# Patient Record
Sex: FEMALE | Race: BLACK OR AFRICAN AMERICAN | URBAN - METROPOLITAN AREA
[De-identification: names, ages, dates, MRNs, and addresses within clinical notes are randomized per-mention and may not be internally consistent; named-entity substitution may affect disease eponyms.]

---

## 2020-06-22 ENCOUNTER — OFFICE VISIT (OUTPATIENT)
Dept: URBAN - METROPOLITAN AREA SURGERY CENTER 18 | Facility: SURGERY CENTER | Age: 50
End: 2020-06-22

## 2020-10-07 ENCOUNTER — OFFICE VISIT (OUTPATIENT)
Dept: URBAN - METROPOLITAN AREA TELEHEALTH 2 | Facility: TELEHEALTH | Age: 50
End: 2020-10-07
Payer: COMMERCIAL

## 2020-10-07 DIAGNOSIS — R49.0 HOARSENESS OF VOICE: ICD-10-CM

## 2020-10-07 DIAGNOSIS — K92.89 GAS BLOAT SYNDROME: ICD-10-CM

## 2020-10-07 DIAGNOSIS — K21.00 GASTROESOPHAGEAL REFLUX DISEASE WITH ESOPHAGITIS WITHOUT HEMORRHAGE: ICD-10-CM

## 2020-10-07 DIAGNOSIS — Z12.11 SCREEN FOR COLON CANCER: ICD-10-CM

## 2020-10-07 PROBLEM — 50219008: Status: ACTIVE | Noted: 2020-10-07

## 2020-10-07 PROBLEM — 305058001: Status: ACTIVE | Noted: 2020-10-07

## 2020-10-07 PROCEDURE — 99204 OFFICE O/P NEW MOD 45 MIN: CPT | Performed by: INTERNAL MEDICINE

## 2020-10-07 RX ORDER — PANTOPRAZOLE SODIUM 40 MG/1
1 TABLET TABLET, DELAYED RELEASE ORAL ONCE A DAY
Qty: 90 | Refills: 3 | OUTPATIENT
Start: 2020-10-07

## 2020-10-07 NOTE — HPI-OTHER HISTORIES
The patient has a history of anemia and migraine headaches who presents for a screening colonoscopy and constipation . The pt notes that she had a hysterectomy in 2010 and she has struggled with constipation for several days. For breakfast, 2 egg whites with toast, fruit  and she notes her water intake and fiber intake is poor. The pt notes that she has noted increased hoarseness of voice, and coughing at night

## 2020-11-20 ENCOUNTER — CLAIMS CREATED FROM THE CLAIM WINDOW (OUTPATIENT)
Dept: URBAN - METROPOLITAN AREA CLINIC 4 | Facility: CLINIC | Age: 50
End: 2020-11-20
Payer: COMMERCIAL

## 2020-11-20 ENCOUNTER — TELEPHONE ENCOUNTER (OUTPATIENT)
Dept: URBAN - METROPOLITAN AREA CLINIC 92 | Facility: CLINIC | Age: 50
End: 2020-11-20

## 2020-11-20 ENCOUNTER — OFFICE VISIT (OUTPATIENT)
Dept: URBAN - METROPOLITAN AREA SURGERY CENTER 16 | Facility: SURGERY CENTER | Age: 50
End: 2020-11-20
Payer: COMMERCIAL

## 2020-11-20 DIAGNOSIS — K29.60 OTHER GASTRITIS WITHOUT BLEEDING: ICD-10-CM

## 2020-11-20 DIAGNOSIS — K62.1 DYSPLASTIC POLYP OF RECTUM: ICD-10-CM

## 2020-11-20 DIAGNOSIS — K25.7 CHRONIC GASTRIC ULCER WITHOUT HEMORRHAGE OR PERFORATION: ICD-10-CM

## 2020-11-20 DIAGNOSIS — Z12.11 COLON CANCER SCREENING: ICD-10-CM

## 2020-11-20 DIAGNOSIS — K31.89 ACQUIRED DEFORMITY OF DUODENUM: ICD-10-CM

## 2020-11-20 DIAGNOSIS — K31.89 OTHER DISEASES OF STOMACH AND DUODENUM: ICD-10-CM

## 2020-11-20 DIAGNOSIS — K25.9 ANTRAL ULCER: ICD-10-CM

## 2020-11-20 DIAGNOSIS — K63.89 OTHER SPECIFIED DISEASES OF INTESTINE: ICD-10-CM

## 2020-11-20 PROCEDURE — 88342 IMHCHEM/IMCYTCHM 1ST ANTB: CPT | Performed by: PATHOLOGY

## 2020-11-20 PROCEDURE — 43239 EGD BIOPSY SINGLE/MULTIPLE: CPT | Performed by: INTERNAL MEDICINE

## 2020-11-20 PROCEDURE — G9935 CANC NOT DETECTD DURING SRCN: HCPCS | Performed by: INTERNAL MEDICINE

## 2020-11-20 PROCEDURE — G8907 PT DOC NO EVENTS ON DISCHARG: HCPCS | Performed by: INTERNAL MEDICINE

## 2020-11-20 PROCEDURE — 88305 TISSUE EXAM BY PATHOLOGIST: CPT | Performed by: PATHOLOGY

## 2020-11-20 PROCEDURE — 45380 COLONOSCOPY AND BIOPSY: CPT | Performed by: INTERNAL MEDICINE

## 2020-11-20 PROCEDURE — 88341 IMHCHEM/IMCYTCHM EA ADD ANTB: CPT | Performed by: PATHOLOGY

## 2020-11-20 RX ORDER — PANTOPRAZOLE SODIUM 40 MG/1
1 TABLET TABLET, DELAYED RELEASE ORAL ONCE A DAY
Qty: 90 | Refills: 3 | Status: ACTIVE | COMMUNITY
Start: 2020-10-07

## 2020-11-20 RX ORDER — LINACLOTIDE 72 UG/1
1 CAPSULE AT LEAST 30 MINUTES BEFORE THE FIRST MEAL OF THE DAY ON AN EMPTY STOMACH CAPSULE, GELATIN COATED ORAL ONCE A DAY
Qty: 90 | Refills: 3 | OUTPATIENT
Start: 2020-11-20 | End: 2021-11-15

## 2021-01-13 ENCOUNTER — TELEPHONE ENCOUNTER (OUTPATIENT)
Dept: URBAN - METROPOLITAN AREA CLINIC 23 | Facility: CLINIC | Age: 51
End: 2021-01-13

## 2021-01-15 ENCOUNTER — APPOINTMENT (RX ONLY)
Dept: URBAN - METROPOLITAN AREA CLINIC 12 | Facility: CLINIC | Age: 51
Setting detail: DERMATOLOGY
End: 2021-01-15

## 2021-01-15 DIAGNOSIS — N62 HYPERTROPHY OF BREAST: ICD-10-CM

## 2021-01-15 DIAGNOSIS — Z41.1 ENCOUNTER FOR COSMETIC SURGERY: ICD-10-CM

## 2021-01-15 PROCEDURE — ? PHOTOS OBTAINED

## 2021-01-15 PROCEDURE — ? COUNSELING - MACROMASTIA

## 2021-01-15 PROCEDURE — 99212 OFFICE O/P EST SF 10 MIN: CPT

## 2021-01-15 PROCEDURE — ? PATIENT SPECIFIC COUNSELING

## 2021-01-15 ASSESSMENT — LOCATION DETAILED DESCRIPTION DERM
LOCATION DETAILED: LEFT MEDIAL BREAST 8-9:00 REGION
LOCATION DETAILED: RIGHT MEDIAL BREAST 4-5:00 REGION

## 2021-01-15 ASSESSMENT — LOCATION SIMPLE DESCRIPTION DERM
LOCATION SIMPLE: RIGHT BREAST
LOCATION SIMPLE: LEFT BREAST

## 2021-01-15 ASSESSMENT — LOCATION ZONE DERM: LOCATION ZONE: TRUNK

## 2021-01-15 NOTE — PROCEDURE: PATIENT SPECIFIC COUNSELING
Detail Level: Simple
Other (Free Text): Patient present today for breast reduction. Patient reports she is currently a bra size 32K and would like to be 32D. Patient reports she is diagnosed with HS. Patient was referred by Dr. Sam. Dr. Anderson examined patient and reviewed medical history. Patient reports issues with shoulders and back pain and reports no history with breast cancer in family. Dr. Anderson voiced right breast is larger than left and breasts are dense and heavy. Dr. Anderson voiced patient has symptomatic macromastia. Dr. Anderson voiced patient is great candidate for breast reduction and explained surgical options and incision sites. Dr. Anderson explained that with HS, patient will need to be kept on antibiotics to reduce risk of infection. Patient understood all risk, surgery, and healing factors. Photos were taken of the surgical site. Nila stepped in to give the patient a quote. \\n\\nHeight 5”3.5\\nWeight 157 lbs

## 2021-01-28 ENCOUNTER — OFFICE VISIT (OUTPATIENT)
Dept: URBAN - METROPOLITAN AREA CLINIC 105 | Facility: CLINIC | Age: 51
End: 2021-01-28
Payer: COMMERCIAL

## 2021-01-28 VITALS
SYSTOLIC BLOOD PRESSURE: 117 MMHG | HEIGHT: 64 IN | DIASTOLIC BLOOD PRESSURE: 79 MMHG | HEART RATE: 93 BPM | WEIGHT: 159.8 LBS | BODY MASS INDEX: 27.28 KG/M2 | TEMPERATURE: 96.4 F

## 2021-01-28 DIAGNOSIS — K58.1 IRRITABLE BOWEL SYNDROME WITH CONSTIPATION: ICD-10-CM

## 2021-01-28 DIAGNOSIS — Z86.010 PERSONAL HISTORY OF COLONIC POLYPS: ICD-10-CM

## 2021-01-28 DIAGNOSIS — K92.89 GAS BLOAT SYNDROME: ICD-10-CM

## 2021-01-28 DIAGNOSIS — F45.8 AEROPHAGIA: ICD-10-CM

## 2021-01-28 DIAGNOSIS — K21.9 GASTRO-ESOPHAGEAL REFLUX DISEASE WITHOUT ESOPHAGITIS: ICD-10-CM

## 2021-01-28 DIAGNOSIS — F41.9 ANXIETY: ICD-10-CM

## 2021-01-28 PROCEDURE — 99214 OFFICE O/P EST MOD 30 MIN: CPT | Performed by: INTERNAL MEDICINE

## 2021-01-28 PROCEDURE — G8420 CALC BMI NORM PARAMETERS: HCPCS | Performed by: INTERNAL MEDICINE

## 2021-01-28 PROCEDURE — 1036F TOBACCO NON-USER: CPT | Performed by: INTERNAL MEDICINE

## 2021-01-28 PROCEDURE — G8482 FLU IMMUNIZE ORDER/ADMIN: HCPCS | Performed by: INTERNAL MEDICINE

## 2021-01-28 PROCEDURE — 3017F COLORECTAL CA SCREEN DOC REV: CPT | Performed by: INTERNAL MEDICINE

## 2021-01-28 RX ORDER — PANTOPRAZOLE SODIUM 40 MG/1
1 TABLET TABLET, DELAYED RELEASE ORAL ONCE A DAY
Qty: 90 | Refills: 3 | Status: ACTIVE | COMMUNITY
Start: 2020-10-07

## 2021-01-28 RX ORDER — LINACLOTIDE 72 UG/1
1 CAPSULE AT LEAST 30 MINUTES BEFORE THE FIRST MEAL OF THE DAY ON AN EMPTY STOMACH CAPSULE, GELATIN COATED ORAL ONCE A DAY
Qty: 90 | Refills: 3 | Status: ACTIVE | COMMUNITY
Start: 2020-11-20 | End: 2021-11-15

## 2021-01-28 RX ORDER — CITALOPRAM 10 MG/1
1 TABLET TABLET, FILM COATED ORAL ONCE A DAY
Qty: 90 TABLET | Refills: 3 | OUTPATIENT
Start: 2021-01-28

## 2021-01-28 NOTE — PHYSICAL EXAM GASTROINTESTINAL
Abdomen , soft, tenderness in the midepigastrium with no rebound tenderness nondistended , no guarding or rigidity , no masses palpable , normal bowel sounds , Liver and Spleen , no hepatomegaly present , no hepatosplenomegaly , liver nontender , spleen not palpable

## 2021-01-28 NOTE — HPI-OTHER HISTORIES
The pt has a history of IBS with change in bowel habits with constipation alternating with diarrhea as she now presents for f/u office visit. She is s/p colon11/2020 revealed esophagitis, gastritis and hyperplastic polyps. She has been under a great deal of stress.

## 2021-04-14 ENCOUNTER — OFFICE VISIT (OUTPATIENT)
Dept: URBAN - METROPOLITAN AREA TELEHEALTH 2 | Facility: TELEHEALTH | Age: 51
End: 2021-04-14
Payer: COMMERCIAL

## 2021-04-14 DIAGNOSIS — F33.1 MODERATE EPISODE OF RECURRENT MAJOR DEPRESSIVE DISORDER: ICD-10-CM

## 2021-04-14 DIAGNOSIS — K59.01 CONSTIPATION BY DELAYED COLONIC TRANSIT: ICD-10-CM

## 2021-04-14 DIAGNOSIS — K92.89 GAS BLOAT SYNDROME: ICD-10-CM

## 2021-04-14 DIAGNOSIS — F41.9 ANXIETY: ICD-10-CM

## 2021-04-14 DIAGNOSIS — K58.1 IRRITABLE BOWEL SYNDROME WITH CONSTIPATION: ICD-10-CM

## 2021-04-14 DIAGNOSIS — F41.8 ANXIETY ABOUT HEALTH: ICD-10-CM

## 2021-04-14 PROBLEM — 191611001: Status: ACTIVE | Noted: 2021-04-14

## 2021-04-14 PROCEDURE — 99214 OFFICE O/P EST MOD 30 MIN: CPT | Performed by: INTERNAL MEDICINE

## 2021-04-14 RX ORDER — CITALOPRAM 10 MG/1
2 TABLETS TABLET, FILM COATED ORAL ONCE A DAY
Qty: 180 TABLET | Refills: 3
Start: 2021-01-28

## 2021-04-14 RX ORDER — LINACLOTIDE 72 UG/1
1 CAPSULE AT LEAST 30 MINUTES BEFORE THE FIRST MEAL OF THE DAY ON AN EMPTY STOMACH CAPSULE, GELATIN COATED ORAL ONCE A DAY
Qty: 90 | Refills: 3 | Status: ACTIVE | COMMUNITY
Start: 2020-11-20 | End: 2021-11-15

## 2021-04-14 RX ORDER — CITALOPRAM 10 MG/1
1 TABLET TABLET, FILM COATED ORAL ONCE A DAY
Qty: 90 TABLET | Refills: 3 | Status: ACTIVE | COMMUNITY
Start: 2021-01-28

## 2021-04-14 RX ORDER — PANTOPRAZOLE SODIUM 40 MG/1
1 TABLET TABLET, DELAYED RELEASE ORAL ONCE A DAY
Qty: 90 | Refills: 3 | Status: ACTIVE | COMMUNITY
Start: 2020-10-07

## 2021-04-14 RX ORDER — VALACYCLOVIR HYDROCHLORIDE 500 MG/1
1 TABLET TABLET, FILM COATED ORAL ONCE A DAY
Status: ACTIVE | COMMUNITY

## 2021-04-14 RX ORDER — LINACLOTIDE 72 UG/1
2 TABLETS CAPSULE, GELATIN COATED ORAL ONCE A DAY
Qty: 180 TABLET | Refills: 3
Start: 2020-11-20 | End: 2022-04-09

## 2021-04-14 RX ORDER — ISOTRETINOIN 40 MG/1
AS DIRECTED CAPSULE, GELATIN COATED ORAL
Status: ACTIVE | COMMUNITY

## 2021-04-14 NOTE — HPI-TODAY'S VISIT:
The pt has a history of IBS with constipation and depression who presents for a f/u office visit. Of note, the pt continues to struggle with anxiety and constipation . She is currently on Linzess 72 mcg and Celexa 10mg daily without resolution of her symptoms. She denies melena, hematemesis or hematochezia. She is due for a f/u colon in 11/2025.

## 2021-05-19 ENCOUNTER — TELEPHONE ENCOUNTER (OUTPATIENT)
Dept: URBAN - METROPOLITAN AREA CLINIC 23 | Facility: CLINIC | Age: 51
End: 2021-05-19

## 2021-05-19 ENCOUNTER — TELEPHONE ENCOUNTER (OUTPATIENT)
Dept: URBAN - METROPOLITAN AREA CLINIC 92 | Facility: CLINIC | Age: 51
End: 2021-05-19

## 2021-05-20 ENCOUNTER — LAB OUTSIDE AN ENCOUNTER (OUTPATIENT)
Dept: URBAN - METROPOLITAN AREA CLINIC 105 | Facility: CLINIC | Age: 51
End: 2021-05-20

## 2021-05-22 LAB
C. DIFFICILE CHEK (GDH), STOOL - QDX: POSITIVE
C. DIFFICILE TOXIN A/B, STOOL - QDX: POSITIVE
GASTROINTESTINAL PATHOGEN: (no result)

## 2021-05-23 ENCOUNTER — TELEPHONE ENCOUNTER (OUTPATIENT)
Dept: URBAN - METROPOLITAN AREA CLINIC 92 | Facility: CLINIC | Age: 51
End: 2021-05-23

## 2021-05-23 RX ORDER — VANCOMYCIN HYDROCHLORIDE 125 MG/1
1 CAPSULE CAPSULE ORAL QID
Qty: 56 CAPSULE | Refills: 0 | Status: ACTIVE | COMMUNITY
Start: 2021-05-23 | End: 2021-06-06

## 2021-05-23 RX ORDER — VALACYCLOVIR HYDROCHLORIDE 500 MG/1
1 TABLET TABLET, FILM COATED ORAL ONCE A DAY
Status: ACTIVE | COMMUNITY

## 2021-05-23 RX ORDER — ISOTRETINOIN 40 MG/1
AS DIRECTED CAPSULE, GELATIN COATED ORAL
Status: ACTIVE | COMMUNITY

## 2021-05-23 RX ORDER — VANCOMYCIN HYDROCHLORIDE 125 MG/1
1 CAPSULE CAPSULE ORAL QID
Qty: 56 CAPSULE | Refills: 0 | OUTPATIENT
Start: 2021-05-23 | End: 2021-06-06

## 2021-05-23 RX ORDER — PANTOPRAZOLE SODIUM 40 MG/1
1 TABLET TABLET, DELAYED RELEASE ORAL ONCE A DAY
Qty: 90 | Refills: 3 | Status: ACTIVE | COMMUNITY
Start: 2020-10-07

## 2021-05-23 RX ORDER — CITALOPRAM 10 MG/1
2 TABLETS TABLET, FILM COATED ORAL ONCE A DAY
Qty: 180 TABLET | Refills: 3 | Status: ACTIVE | COMMUNITY
Start: 2021-01-28

## 2021-05-23 RX ORDER — LINACLOTIDE 72 UG/1
2 TABLETS CAPSULE, GELATIN COATED ORAL ONCE A DAY
Qty: 180 TABLET | Refills: 3 | Status: ACTIVE | COMMUNITY
Start: 2020-11-20 | End: 2022-04-09

## 2021-05-23 RX ORDER — VANCOMYCIN HYDROCHLORIDE 250 MG/1
1 TABLET CAPSULE ORAL QID
Qty: 56 TABLET | Refills: 1 | OUTPATIENT
Start: 2021-05-23 | End: 2021-06-20

## 2021-05-25 ENCOUNTER — TELEPHONE ENCOUNTER (OUTPATIENT)
Dept: URBAN - METROPOLITAN AREA CLINIC 23 | Facility: CLINIC | Age: 51
End: 2021-05-25

## 2021-05-26 ENCOUNTER — OUT OF OFFICE VISIT (OUTPATIENT)
Dept: URBAN - METROPOLITAN AREA MEDICAL CENTER 28 | Facility: MEDICAL CENTER | Age: 51
End: 2021-05-26
Payer: COMMERCIAL

## 2021-05-26 DIAGNOSIS — R11.2 ACUTE NAUSEA WITH NONBILIOUS VOMITING: ICD-10-CM

## 2021-05-26 DIAGNOSIS — R18.8 ABDOMINAL FLUID COLLECTION: ICD-10-CM

## 2021-05-26 DIAGNOSIS — A04.72 C. DIFFICILE COLITIS: ICD-10-CM

## 2021-05-26 PROCEDURE — 99222 1ST HOSP IP/OBS MODERATE 55: CPT | Performed by: INTERNAL MEDICINE

## 2021-05-26 PROCEDURE — G8427 DOCREV CUR MEDS BY ELIG CLIN: HCPCS | Performed by: INTERNAL MEDICINE

## 2021-05-27 ENCOUNTER — OUT OF OFFICE VISIT (OUTPATIENT)
Dept: URBAN - METROPOLITAN AREA MEDICAL CENTER 28 | Facility: MEDICAL CENTER | Age: 51
End: 2021-05-27
Payer: COMMERCIAL

## 2021-05-27 DIAGNOSIS — K20.80 ESOPHAGITIS DISSECANS SUPERFICIALIS: ICD-10-CM

## 2021-05-27 DIAGNOSIS — K29.60 ADENOPAPILLOMATOSIS GASTRICA: ICD-10-CM

## 2021-05-27 DIAGNOSIS — R11.2 ACUTE NAUSEA WITH NONBILIOUS VOMITING: ICD-10-CM

## 2021-05-27 PROCEDURE — 43239 EGD BIOPSY SINGLE/MULTIPLE: CPT | Performed by: INTERNAL MEDICINE

## 2021-05-28 ENCOUNTER — OUT OF OFFICE VISIT (OUTPATIENT)
Dept: URBAN - METROPOLITAN AREA MEDICAL CENTER 28 | Facility: MEDICAL CENTER | Age: 51
End: 2021-05-28
Payer: COMMERCIAL

## 2021-05-28 DIAGNOSIS — A04.72 C. DIFFICILE COLITIS: ICD-10-CM

## 2021-05-28 DIAGNOSIS — K31.89 ACQUIRED DEFORMITY OF DUODENUM: ICD-10-CM

## 2021-05-28 DIAGNOSIS — K22.8 COLUMNAR-LINED ESOPHAGUS: ICD-10-CM

## 2021-05-28 DIAGNOSIS — R11.2 ACUTE NAUSEA WITH NONBILIOUS VOMITING: ICD-10-CM

## 2021-05-28 PROCEDURE — 99232 SBSQ HOSP IP/OBS MODERATE 35: CPT | Performed by: INTERNAL MEDICINE

## 2021-06-02 ENCOUNTER — TELEPHONE ENCOUNTER (OUTPATIENT)
Dept: URBAN - METROPOLITAN AREA CLINIC 98 | Facility: CLINIC | Age: 51
End: 2021-06-02

## 2021-06-04 ENCOUNTER — TELEPHONE ENCOUNTER (OUTPATIENT)
Dept: URBAN - METROPOLITAN AREA CLINIC 96 | Facility: CLINIC | Age: 51
End: 2021-06-04

## 2021-06-09 ENCOUNTER — OFFICE VISIT (OUTPATIENT)
Dept: URBAN - METROPOLITAN AREA TELEHEALTH 2 | Facility: TELEHEALTH | Age: 51
End: 2021-06-09

## 2021-06-15 ENCOUNTER — OFFICE VISIT (OUTPATIENT)
Dept: URBAN - METROPOLITAN AREA CLINIC 50 | Facility: CLINIC | Age: 51
End: 2021-06-15
Payer: COMMERCIAL

## 2021-06-15 ENCOUNTER — WEB ENCOUNTER (OUTPATIENT)
Dept: URBAN - METROPOLITAN AREA CLINIC 50 | Facility: CLINIC | Age: 51
End: 2021-06-15

## 2021-06-15 DIAGNOSIS — K58.1 IRRITABLE BOWEL SYNDROME WITH CONSTIPATION: ICD-10-CM

## 2021-06-15 DIAGNOSIS — Z86.010 PERSONAL HISTORY OF COLONIC POLYPS: ICD-10-CM

## 2021-06-15 DIAGNOSIS — K29.50 CHRONIC GASTRITIS WITHOUT BLEEDING, UNSPECIFIED GASTRITIS TYPE: ICD-10-CM

## 2021-06-15 DIAGNOSIS — A04.72 CLOSTRIDIUM DIFFICILE COLITIS: ICD-10-CM

## 2021-06-15 DIAGNOSIS — K21.00 GASTROESOPHAGEAL REFLUX DISEASE WITH ESOPHAGITIS WITHOUT HEMORRHAGE: ICD-10-CM

## 2021-06-15 PROBLEM — 48694002: Status: ACTIVE | Noted: 2021-01-28

## 2021-06-15 PROBLEM — 371101003: Status: ACTIVE | Noted: 2021-01-28

## 2021-06-15 PROBLEM — 428283002: Status: ACTIVE | Noted: 2021-01-28

## 2021-06-15 PROBLEM — 30693006: Status: ACTIVE | Noted: 2021-01-28

## 2021-06-15 PROBLEM — 8493009: Status: ACTIVE | Noted: 2021-06-15

## 2021-06-15 PROBLEM — 129851009: Status: ACTIVE | Noted: 2020-10-07

## 2021-06-15 PROCEDURE — 99213 OFFICE O/P EST LOW 20 MIN: CPT | Performed by: INTERNAL MEDICINE

## 2021-06-15 RX ORDER — LINACLOTIDE 72 UG/1
2 TABLETS CAPSULE, GELATIN COATED ORAL ONCE A DAY
Qty: 180 TABLET | Refills: 3 | Status: ACTIVE | COMMUNITY
Start: 2020-11-20 | End: 2022-04-09

## 2021-06-15 RX ORDER — VANCOMYCIN HYDROCHLORIDE 250 MG/1
1 TABLET CAPSULE ORAL QID
Qty: 56 TABLET | Refills: 1 | Status: ACTIVE | COMMUNITY
Start: 2021-05-23 | End: 2021-06-20

## 2021-06-15 RX ORDER — ISOTRETINOIN 40 MG/1
AS DIRECTED CAPSULE, GELATIN COATED ORAL
Status: ACTIVE | COMMUNITY

## 2021-06-15 RX ORDER — CITALOPRAM 10 MG/1
2 TABLETS TABLET, FILM COATED ORAL ONCE A DAY
Qty: 180 TABLET | Refills: 3 | Status: ACTIVE | COMMUNITY
Start: 2021-01-28

## 2021-06-15 RX ORDER — VALACYCLOVIR HYDROCHLORIDE 500 MG/1
1 TABLET TABLET, FILM COATED ORAL ONCE A DAY
Status: ACTIVE | COMMUNITY

## 2021-06-15 RX ORDER — PANTOPRAZOLE SODIUM 40 MG/1
1 TABLET TABLET, DELAYED RELEASE ORAL ONCE A DAY
Qty: 90 | Refills: 3 | Status: ACTIVE | COMMUNITY
Start: 2020-10-07

## 2021-06-15 NOTE — HPI-TODAY'S VISIT:
sees Dr. Mckinney had a recent cdiff, did well w/ dificid had an EGD that showed gastritis, chronic and active but no h.pylori was put on abx for HS by dermatologist and thats how she got cdiff  had a colonoscopy 11/2020 ========================== The pt has a history of IBS with constipation and depression who presents for a f/u office visit. Of note, the pt continues to struggle with anxiety and constipation . She is currently on Linzess 72 mcg and Celexa 10mg daily without resolution of her symptoms. She denies melena, hematemesis or hematochezia.

## 2021-06-19 LAB
H PYLORI, IGM ABS: <9
H. PYLORI, IGA ABS: <9
H. PYLORI, IGG ABS: 0.46

## 2021-06-30 ENCOUNTER — OFFICE VISIT (OUTPATIENT)
Dept: URBAN - METROPOLITAN AREA CLINIC 96 | Facility: CLINIC | Age: 51
End: 2021-06-30

## 2022-03-14 ENCOUNTER — TELEPHONE ENCOUNTER (OUTPATIENT)
Dept: URBAN - METROPOLITAN AREA CLINIC 23 | Facility: CLINIC | Age: 52
End: 2022-03-14

## 2022-03-14 RX ORDER — LINACLOTIDE 72 UG/1
2 TABLETS CAPSULE, GELATIN COATED ORAL ONCE A DAY
Qty: 180 TABLET | Refills: 3
Start: 2020-11-20 | End: 2023-03-09

## 2022-03-14 RX ORDER — PANTOPRAZOLE SODIUM 40 MG/1
1 TABLET TABLET, DELAYED RELEASE ORAL ONCE A DAY
Qty: 90 | Refills: 3
Start: 2020-10-07

## 2022-05-17 ENCOUNTER — OFFICE VISIT (OUTPATIENT)
Dept: URBAN - METROPOLITAN AREA CLINIC 50 | Facility: CLINIC | Age: 52
End: 2022-05-17
Payer: COMMERCIAL

## 2022-05-17 VITALS
DIASTOLIC BLOOD PRESSURE: 79 MMHG | SYSTOLIC BLOOD PRESSURE: 125 MMHG | HEIGHT: 64 IN | BODY MASS INDEX: 29.02 KG/M2 | HEART RATE: 67 BPM | TEMPERATURE: 97.2 F | WEIGHT: 170 LBS

## 2022-05-17 DIAGNOSIS — K92.89 GAS BLOAT SYNDROME: ICD-10-CM

## 2022-05-17 DIAGNOSIS — K21.00 GASTROESOPHAGEAL REFLUX DISEASE WITH ESOPHAGITIS WITHOUT HEMORRHAGE: ICD-10-CM

## 2022-05-17 DIAGNOSIS — Z86.19 HISTORY OF CLOSTRIDIUM DIFFICILE COLITIS: ICD-10-CM

## 2022-05-17 PROCEDURE — 99214 OFFICE O/P EST MOD 30 MIN: CPT | Performed by: INTERNAL MEDICINE

## 2022-05-17 RX ORDER — PANTOPRAZOLE SODIUM 40 MG/1
1 TABLET TABLET, DELAYED RELEASE ORAL ONCE A DAY
Qty: 90 | Refills: 3

## 2022-05-17 RX ORDER — VALACYCLOVIR HYDROCHLORIDE 500 MG/1
1 TABLET TABLET, FILM COATED ORAL ONCE A DAY
Status: DISCONTINUED | COMMUNITY

## 2022-05-17 RX ORDER — PANTOPRAZOLE SODIUM 40 MG/1
1 TABLET TABLET, DELAYED RELEASE ORAL ONCE A DAY
Qty: 90 | Refills: 3 | Status: ACTIVE | COMMUNITY
Start: 2020-10-07

## 2022-05-17 RX ORDER — ISOTRETINOIN 40 MG/1
AS DIRECTED CAPSULE, GELATIN COATED ORAL
Status: DISCONTINUED | COMMUNITY

## 2022-05-17 RX ORDER — LINACLOTIDE 72 UG/1
2 TABLETS CAPSULE, GELATIN COATED ORAL ONCE A DAY
Qty: 180 TABLET | Refills: 3 | Status: DISCONTINUED | COMMUNITY
Start: 2020-11-20 | End: 2023-03-09

## 2022-05-17 RX ORDER — CITALOPRAM 20 MG/1
1 TABLET TABLET, FILM COATED ORAL ONCE A DAY
Status: ACTIVE | COMMUNITY

## 2022-05-17 RX ORDER — CITALOPRAM 10 MG/1
2 TABLETS TABLET, FILM COATED ORAL ONCE A DAY
Qty: 180 TABLET | Refills: 3 | Status: DISCONTINUED | COMMUNITY
Start: 2021-01-28

## 2022-05-17 RX ORDER — LINACLOTIDE 72 UG/1
1 CAPSULE AT LEAST 30 MINUTES BEFORE THE FIRST MEAL OF THE DAY ON AN EMPTY STOMACH CAPSULE, GELATIN COATED ORAL ONCE A DAY
Qty: 90 | Refills: 3

## 2022-05-17 NOTE — HPI-TODAY'S VISIT:
gas bloat constiapted not using hte bathroom at all. 1-2 BMs/week. no longer taking linzess, ran out of it. wants to know if she should get off her celexa was on linzess w/ dr. mckinney and he added celexa for IBS. when she had cdiff she stopped linzess and then ran out. lately more regular but still w/ gas. concnered colon reviewed noncompliant w/ celexa, takes some days  ============================================ sees Dr. Mckinney had a recent cdiff, did well w/ dificid had an EGD that showed gastritis, chronic and active but no h.pylori was put on abx for HS by dermatologist and thats how she got cdiff  had a colonoscopy 11/2020 ========================== The pt has a history of IBS with constipation and depression who presents for a f/u office visit. Of note, the pt continues to struggle with anxiety and constipation . She is currently on Linzess 72 mcg and Celexa 10mg daily without resolution of her symptoms. She denies melena, hematemesis or hematochezia.

## 2022-06-09 ENCOUNTER — TELEPHONE ENCOUNTER (OUTPATIENT)
Dept: URBAN - METROPOLITAN AREA CLINIC 98 | Facility: CLINIC | Age: 52
End: 2022-06-09

## 2022-08-22 ENCOUNTER — OFFICE VISIT (OUTPATIENT)
Dept: URBAN - METROPOLITAN AREA TELEHEALTH 2 | Facility: TELEHEALTH | Age: 52
End: 2022-08-22
Payer: COMMERCIAL

## 2022-08-22 ENCOUNTER — TELEPHONE ENCOUNTER (OUTPATIENT)
Dept: URBAN - METROPOLITAN AREA CLINIC 92 | Facility: CLINIC | Age: 52
End: 2022-08-22

## 2022-08-22 VITALS — HEIGHT: 64 IN | WEIGHT: 170 LBS | BODY MASS INDEX: 29.02 KG/M2

## 2022-08-22 DIAGNOSIS — K92.89 GAS BLOAT SYNDROME: ICD-10-CM

## 2022-08-22 DIAGNOSIS — K21.00 GASTROESOPHAGEAL REFLUX DISEASE WITH ESOPHAGITIS WITHOUT HEMORRHAGE: ICD-10-CM

## 2022-08-22 DIAGNOSIS — K59.01 CONSTIPATION BY DELAYED COLONIC TRANSIT: ICD-10-CM

## 2022-08-22 DIAGNOSIS — Z86.19 HISTORY OF CLOSTRIDIUM DIFFICILE COLITIS: ICD-10-CM

## 2022-08-22 PROBLEM — 119292006: Status: ACTIVE | Noted: 2020-10-07

## 2022-08-22 PROCEDURE — 99213 OFFICE O/P EST LOW 20 MIN: CPT | Performed by: INTERNAL MEDICINE

## 2022-08-22 RX ORDER — PANTOPRAZOLE SODIUM 40 MG/1
1 TABLET TABLET, DELAYED RELEASE ORAL ONCE A DAY
Qty: 90 | Refills: 3

## 2022-08-22 RX ORDER — LINACLOTIDE 72 UG/1
1 CAPSULE AT LEAST 30 MINUTES BEFORE THE FIRST MEAL OF THE DAY ON AN EMPTY STOMACH CAPSULE, GELATIN COATED ORAL ONCE A DAY
Qty: 90 | Refills: 3 | Status: ACTIVE | COMMUNITY

## 2022-08-22 RX ORDER — LINACLOTIDE 145 UG/1
1 CAPSULE AT LEAST 30 MINUTES BEFORE THE FIRST MEAL OF THE DAY ON AN EMPTY STOMACH CAPSULE, GELATIN COATED ORAL ONCE A DAY
Qty: 90 | Refills: 3 | OUTPATIENT

## 2022-08-22 RX ORDER — PANTOPRAZOLE SODIUM 40 MG/1
1 TABLET TABLET, DELAYED RELEASE ORAL ONCE A DAY
Qty: 90 | Refills: 3 | Status: ACTIVE | COMMUNITY

## 2022-08-22 RX ORDER — CITALOPRAM 20 MG/1
1 TABLET TABLET, FILM COATED ORAL ONCE A DAY
Status: ON HOLD | COMMUNITY

## 2022-08-22 NOTE — HPI-TODAY'S VISIT:
8/22/2022 TH f/u. feeling pretty good. has weaned off celexa (last GI doctor had her on this) still taking linzess and it helps. sometimes it works, sometimes not. 4 bm/week.  ==============================================================  not using hte bathroom at all. 1-2 BMs/week. no longer taking linzess, ran out of it. wants to know if she should get off her celexa was on linzess w/ dr. mckinney and he added celexa for IBS. when she had cdiff she stopped linzess and then ran out. lately more regular but still w/ gas. concnered colon reviewed noncompliant w/ celexa, takes some days  ============================================ sees Dr. Mckinney had a recent cdiff, did well w/ dificid had an EGD that showed gastritis, chronic and active but no h.pylori was put on abx for HS by dermatologist and thats how she got cdiff  had a colonoscopy 11/2020 ========================== The pt has a history of IBS with constipation and depression who presents for a f/u office visit. Of note, the pt continues to struggle with anxiety and constipation . She is currently on Linzess 72 mcg and Celexa 10mg daily without resolution of her symptoms. She denies melena, hematemesis or hematochezia. sees Dr. Mckinney had a recent cdiff, did well w/ dificid had an EGD that showed gastritis, chronic and active but no h.pylori was put on abx for HS by dermatologist and thats how she got cdiff  had a colonoscopy 11/2020 ========================== The pt has a history of IBS with constipation and depression who presents for a f/u office visit. Of note, the pt continues to struggle with anxiety and constipation . She is currently on Linzess 72 mcg and Celexa 10mg daily without resolution of her symptoms. She denies melena, hematemesis or hematochezia. The pt has a history of IBS with constipation and depression who presents for a f/u office visit. Of note, the pt continues to struggle with anxiety and constipation . She is currently on Linzess 72 mcg and Celexa 10mg daily without resolution of her symptoms. She denies melena, hematemesis or hematochezia. She is due for a f/u colon in 11/2025.

## 2022-11-01 ENCOUNTER — OFFICE VISIT (OUTPATIENT)
Dept: URBAN - METROPOLITAN AREA TELEHEALTH 2 | Facility: TELEHEALTH | Age: 52
End: 2022-11-01
Payer: COMMERCIAL

## 2022-11-01 VITALS — HEIGHT: 64 IN

## 2022-11-01 DIAGNOSIS — K21.00 GASTROESOPHAGEAL REFLUX DISEASE WITH ESOPHAGITIS WITHOUT HEMORRHAGE: ICD-10-CM

## 2022-11-01 DIAGNOSIS — K58.1 IRRITABLE BOWEL SYNDROME WITH CONSTIPATION: ICD-10-CM

## 2022-11-01 PROCEDURE — 99213 OFFICE O/P EST LOW 20 MIN: CPT | Performed by: INTERNAL MEDICINE

## 2022-11-01 RX ORDER — PANTOPRAZOLE SODIUM 40 MG/1
1 TABLET TABLET, DELAYED RELEASE ORAL ONCE A DAY
Qty: 90 | Refills: 3 | Status: ACTIVE | COMMUNITY

## 2022-11-01 RX ORDER — CITALOPRAM 20 MG/1
1 TABLET TABLET, FILM COATED ORAL ONCE A DAY
Status: ON HOLD | COMMUNITY

## 2022-11-01 RX ORDER — LINACLOTIDE 72 UG/1
1 CAPSULE AT LEAST 30 MINUTES BEFORE THE FIRST MEAL OF THE DAY ON AN EMPTY STOMACH CAPSULE, GELATIN COATED ORAL ONCE A DAY
Qty: 90 | Refills: 3 | Status: ON HOLD | COMMUNITY

## 2022-11-01 RX ORDER — LINACLOTIDE 145 UG/1
1 CAPSULE AT LEAST 30 MINUTES BEFORE THE FIRST MEAL OF THE DAY ON AN EMPTY STOMACH CAPSULE, GELATIN COATED ORAL ONCE A DAY
Qty: 90 | Refills: 3 | Status: ACTIVE | COMMUNITY

## 2022-11-01 NOTE — HPI-TODAY'S VISIT:
Patient is a 52-year-old female presents via telehealth for follow-up on constipation and reflux.   Last seen by Dr. Wing on 8/22/2022.   Previously taking Linzess and endorses it helps having about 4 bowel movements per week.  Last visit Linzess was increased to 145 mcg.  She states she does not want to be on medication long term. She has not been taking it and reports she can't remember the last time she took it  Had a BM yesterday but prior to yesterday's BM, she is unabe to remember last time she had a BM  Described as harder and not as soft as should be  When she is consistent, she has a BM every other day  Denies BRBPR or melena  Colonoscopy completed in 2020 was normal except 1 polyp and was recommended repeat in 3 to 5 years.  Has now weaned off Celexa. Remains on Pantoprazole 40mg once a day- she ran out for about 2-3 weeks before she was able to refill and did have hearbturn in that time. Took "some time" to resolve symptoms after resuming medication  Denies dysphagia or choking episodes Denies vomiting episodes EGD completed in 5/2021 which revealed LA grade C esophagitis with gastritis. Negative for h pylori or intestinal metaplasia. Of note, patient had EGD in [facility] after vomiting

## 2022-12-07 ENCOUNTER — WEB ENCOUNTER (OUTPATIENT)
Dept: URBAN - METROPOLITAN AREA CLINIC 96 | Facility: CLINIC | Age: 52
End: 2022-12-07

## 2022-12-09 ENCOUNTER — WEB ENCOUNTER (OUTPATIENT)
Dept: URBAN - METROPOLITAN AREA CLINIC 96 | Facility: CLINIC | Age: 52
End: 2022-12-09

## 2023-01-11 ENCOUNTER — TELEPHONE ENCOUNTER (OUTPATIENT)
Dept: URBAN - METROPOLITAN AREA CLINIC 96 | Facility: CLINIC | Age: 53
End: 2023-01-11

## 2023-01-12 ENCOUNTER — WEB ENCOUNTER (OUTPATIENT)
Dept: URBAN - METROPOLITAN AREA CLINIC 96 | Facility: CLINIC | Age: 53
End: 2023-01-12

## 2023-01-12 ENCOUNTER — OFFICE VISIT (OUTPATIENT)
Dept: URBAN - METROPOLITAN AREA CLINIC 96 | Facility: CLINIC | Age: 53
End: 2023-01-12
Payer: COMMERCIAL

## 2023-01-12 VITALS
DIASTOLIC BLOOD PRESSURE: 74 MMHG | HEIGHT: 64 IN | TEMPERATURE: 98.1 F | SYSTOLIC BLOOD PRESSURE: 112 MMHG | BODY MASS INDEX: 28.68 KG/M2 | WEIGHT: 168 LBS

## 2023-01-12 DIAGNOSIS — A09 DIARRHEA OF INFECTIOUS ORIGIN: ICD-10-CM

## 2023-01-12 DIAGNOSIS — R05.3 CHRONIC COUGH: ICD-10-CM

## 2023-01-12 DIAGNOSIS — Z86.19 HISTORY OF CLOSTRIDIOIDES DIFFICILE COLITIS: ICD-10-CM

## 2023-01-12 PROBLEM — 266433003: Status: ACTIVE | Noted: 2020-10-07

## 2023-01-12 PROBLEM — 35298007: Status: ACTIVE | Noted: 2021-04-14

## 2023-01-12 PROBLEM — 440630006: Status: ACTIVE | Noted: 2020-11-20

## 2023-01-12 PROCEDURE — 99213 OFFICE O/P EST LOW 20 MIN: CPT | Performed by: INTERNAL MEDICINE

## 2023-01-12 RX ORDER — LINACLOTIDE 72 UG/1
1 CAPSULE AT LEAST 30 MINUTES BEFORE THE FIRST MEAL OF THE DAY ON AN EMPTY STOMACH CAPSULE, GELATIN COATED ORAL ONCE A DAY
Qty: 90 | Refills: 3 | Status: ON HOLD | COMMUNITY

## 2023-01-12 RX ORDER — PANTOPRAZOLE SODIUM 40 MG/1
1 TABLET TABLET, DELAYED RELEASE ORAL ONCE A DAY
Qty: 90 | Refills: 3 | Status: ACTIVE | COMMUNITY

## 2023-01-12 RX ORDER — LINACLOTIDE 145 UG/1
1 CAPSULE AT LEAST 30 MINUTES BEFORE THE FIRST MEAL OF THE DAY ON AN EMPTY STOMACH CAPSULE, GELATIN COATED ORAL ONCE A DAY
Qty: 90 | Refills: 3 | Status: ACTIVE | COMMUNITY

## 2023-01-12 RX ORDER — CITALOPRAM 20 MG/1
1 TABLET TABLET, FILM COATED ORAL ONCE A DAY
Status: ON HOLD | COMMUNITY

## 2023-01-12 NOTE — HPI-TODAY'S VISIT:
emergent visit for diarrhea went to [facility]. has had diarrhea. 2-3 loose stools. tried peptobismol when she was in [facility]. helped somewhat tried charcoal pills as well which helped. things got worse on 1/8. cramps. multiple loose stools a day stopped malaria pills no recent abx hx of cdiff in may 2021 just does not feel well has a chronic cough after some infection no fever no abd pain. no blood in stool  ==================================== Patient is a 52-year-old female presents via telehealth for follow-up on constipation and reflux.   Last seen by Dr. Wing on 8/22/2022.   Previously taking Linzess and endorses it helps having about 4 bowel movements per week.  Last visit Linzess was increased to 145 mcg.  She states she does not want to be on medication long term. She has not been taking it and reports she can't remember the last time she took it  Had a BM yesterday but prior to yesterday's BM, she is unabe to remember last time she had a BM  Described as harder and not as soft as should be  When she is consistent, she has a BM every other day  Denies BRBPR or melena  Colonoscopy completed in 2020 was normal except 1 polyp and was recommended repeat in 3 to 5 years.  Has now weaned off Celexa. Remains on Pantoprazole 40mg once a day- she ran out for about 2-3 weeks before she was able to refill and did have hearbturn in that time. Took "some time" to resolve symptoms after resuming medication  Denies dysphagia or choking episodes Denies vomiting episodes EGD completed in 5/2021 which revealed LA grade C esophagitis with gastritis. Negative for h pylori or intestinal metaplasia. Of note, patient had EGD in [facility] after vomiting

## 2023-07-10 ENCOUNTER — TELEPHONE ENCOUNTER (OUTPATIENT)
Dept: URBAN - METROPOLITAN AREA CLINIC 96 | Facility: CLINIC | Age: 53
End: 2023-07-10

## 2023-07-10 RX ORDER — PANTOPRAZOLE SODIUM 40 MG/1
1 TABLET TABLET, DELAYED RELEASE ORAL ONCE A DAY
Qty: 90 | Refills: 3

## 2023-08-04 ENCOUNTER — TELEPHONE ENCOUNTER (OUTPATIENT)
Dept: URBAN - METROPOLITAN AREA CLINIC 96 | Facility: CLINIC | Age: 53
End: 2023-08-04

## 2023-08-04 ENCOUNTER — OFFICE VISIT (OUTPATIENT)
Dept: URBAN - METROPOLITAN AREA TELEHEALTH 2 | Facility: TELEHEALTH | Age: 53
End: 2023-08-04

## 2023-08-04 ENCOUNTER — OFFICE VISIT (OUTPATIENT)
Dept: URBAN - METROPOLITAN AREA TELEHEALTH 2 | Facility: TELEHEALTH | Age: 53
End: 2023-08-04
Payer: COMMERCIAL

## 2023-08-04 VITALS — BODY MASS INDEX: 27.14 KG/M2 | WEIGHT: 159 LBS | HEIGHT: 64 IN

## 2023-08-04 DIAGNOSIS — K21.00 GASTROESOPHAGEAL REFLUX DISEASE WITH ESOPHAGITIS WITHOUT HEMORRHAGE: ICD-10-CM

## 2023-08-04 DIAGNOSIS — Z86.19 HISTORY OF CLOSTRIDIOIDES DIFFICILE COLITIS: ICD-10-CM

## 2023-08-04 DIAGNOSIS — K58.1 IRRITABLE BOWEL SYNDROME WITH CONSTIPATION: ICD-10-CM

## 2023-08-04 DIAGNOSIS — R05.3 CHRONIC COUGH: ICD-10-CM

## 2023-08-04 PROCEDURE — 99213 OFFICE O/P EST LOW 20 MIN: CPT | Performed by: INTERNAL MEDICINE

## 2023-08-04 RX ORDER — PANTOPRAZOLE SODIUM 40 MG/1
1 TABLET TABLET, DELAYED RELEASE ORAL ONCE A DAY
Qty: 90 | Refills: 3 | COMMUNITY

## 2023-08-04 RX ORDER — LINACLOTIDE 290 UG/1
1 CAPSULE AT LEAST 30 MINUTES BEFORE THE FIRST MEAL OF THE DAY ON AN EMPTY STOMACH CAPSULE, GELATIN COATED ORAL ONCE A DAY
Qty: 90 | Refills: 3 | OUTPATIENT

## 2023-08-04 RX ORDER — CITALOPRAM 20 MG/1
1 TABLET TABLET, FILM COATED ORAL ONCE A DAY
COMMUNITY

## 2023-08-04 RX ORDER — LINACLOTIDE 145 UG/1
1 CAPSULE AT LEAST 30 MINUTES BEFORE THE FIRST MEAL OF THE DAY ON AN EMPTY STOMACH CAPSULE, GELATIN COATED ORAL ONCE A DAY
Qty: 90 | Refills: 3 | COMMUNITY

## 2023-08-04 RX ORDER — LINACLOTIDE 72 UG/1
1 CAPSULE AT LEAST 30 MINUTES BEFORE THE FIRST MEAL OF THE DAY ON AN EMPTY STOMACH CAPSULE, GELATIN COATED ORAL ONCE A DAY
Qty: 90 | Refills: 3 | COMMUNITY

## 2023-08-04 NOTE — HPI-TODAY'S VISIT:
taking linzess daily, pepe snot know the dose but on chart review, maybe  still w/ a lot of constiaptoin can go up to a week with out a BM   ========================= emergent visit for diarrhea went to [facility]. has had diarrhea. 2-3 loose stools. tried peptobismol when she was in [facility]. helped somewhat tried charcoal pills as well which helped. things got worse on 1/8. cramps. multiple loose stools a day stopped malaria pills no recent abx hx of cdiff in may 2021 just does not feel well has a chronic cough after some infection no fever no abd pain. no blood in stool  ==================================== Patient is a 52-year-old female presents via telehealth for follow-up on constipation and reflux.   Last seen by Dr. Wing on 8/22/2022.   Previously taking Linzess and endorses it helps having about 4 bowel movements per week.  Last visit Linzess was increased to 145 mcg.  She states she does not want to be on medication long term. She has not been taking it and reports she can't remember the last time she took it  Had a BM yesterday but prior to yesterday's BM, she is unabe to remember last time she had a BM  Described as harder and not as soft as should be  When she is consistent, she has a BM every other day  Denies BRBPR or melena  Colonoscopy completed in 2020 was normal except 1 polyp and was recommended repeat in 3 to 5 years.  Has now weaned off Celexa. Remains on Pantoprazole 40mg once a day- she ran out for about 2-3 weeks before she was able to refill and did have hearbturn in that time. Took "some time" to resolve symptoms after resuming medication  Denies dysphagia or choking episodes Denies vomiting episodes EGD completed in 5/2021 which revealed LA grade C esophagitis with gastritis. Negative for h pylori or intestinal metaplasia. Of note, patient had EGD in [facility] after vomiting

## 2023-09-07 ENCOUNTER — DASHBOARD ENCOUNTERS (OUTPATIENT)
Age: 53
End: 2023-09-07

## 2023-09-12 ENCOUNTER — OFFICE VISIT (OUTPATIENT)
Dept: URBAN - METROPOLITAN AREA TELEHEALTH 2 | Facility: TELEHEALTH | Age: 53
End: 2023-09-12
Payer: COMMERCIAL

## 2023-09-12 VITALS — BODY MASS INDEX: 26.29 KG/M2 | HEIGHT: 64 IN | WEIGHT: 154 LBS

## 2023-09-12 DIAGNOSIS — R05.3 CHRONIC COUGH: ICD-10-CM

## 2023-09-12 DIAGNOSIS — K58.2 IRRITABLE BOWEL SYNDROME WITH ALTERNATING BOWEL HABITS: ICD-10-CM

## 2023-09-12 DIAGNOSIS — K21.00 GASTROESOPHAGEAL REFLUX DISEASE WITH ESOPHAGITIS WITHOUT HEMORRHAGE: ICD-10-CM

## 2023-09-12 DIAGNOSIS — R14.0 ABDOMINAL BLOATING: ICD-10-CM

## 2023-09-12 PROCEDURE — 99213 OFFICE O/P EST LOW 20 MIN: CPT | Performed by: INTERNAL MEDICINE

## 2023-09-12 RX ORDER — CITALOPRAM 20 MG/1
1 TABLET TABLET, FILM COATED ORAL ONCE A DAY
Status: ON HOLD | COMMUNITY

## 2023-09-12 RX ORDER — VALACYCLOVIR HYDROCHLORIDE 1 G/1
1 TABLET TABLET, FILM COATED ORAL ONCE A DAY
Status: ACTIVE | COMMUNITY

## 2023-09-12 RX ORDER — LINACLOTIDE 290 UG/1
1 CAPSULE AT LEAST 30 MINUTES BEFORE THE FIRST MEAL OF THE DAY ON AN EMPTY STOMACH CAPSULE, GELATIN COATED ORAL ONCE A DAY
Qty: 90 | Refills: 3 | Status: ACTIVE | COMMUNITY

## 2023-09-12 RX ORDER — LINACLOTIDE 72 UG/1
1 CAPSULE AT LEAST 30 MINUTES BEFORE THE FIRST MEAL OF THE DAY ON AN EMPTY STOMACH CAPSULE, GELATIN COATED ORAL ONCE A DAY
Qty: 90 | Refills: 3 | Status: ON HOLD | COMMUNITY

## 2023-09-12 RX ORDER — PANTOPRAZOLE SODIUM 40 MG/1
1 TABLET TABLET, DELAYED RELEASE ORAL ONCE A DAY
Qty: 90 | Refills: 3 | Status: ACTIVE | COMMUNITY

## 2023-09-12 NOTE — HPI-TODAY'S VISIT:
took linzess 290 and it was not working but then took it after a meal and it worked. colonoscopy 11/2020 does not want to rely on medication for her STC  this all started after her partial hysterecomy in 2010 =========================================== taking linzess daily, pepe snot know the dose but on chart review, maybe  still w/ a lot of constiaptoin can go up to a week with out a BM   ========================= emergent visit for diarrhea went to [facility]. has had diarrhea. 2-3 loose stools. tried peptobismol when she was in [facility]. helped somewhat tried charcoal pills as well which helped. things got worse on 1/8. cramps. multiple loose stools a day stopped malaria pills no recent abx hx of cdiff in may 2021 just does not feel well has a chronic cough after some infection no fever no abd pain. no blood in stool  ==================================== Patient is a 52-year-old female presents via telehealth for follow-up on constipation and reflux.   Last seen by Dr. Wing on 8/22/2022.   Previously taking Linzess and endorses it helps having about 4 bowel movements per week.  Last visit Linzess was increased to 145 mcg.  She states she does not want to be on medication long term. She has not been taking it and reports she can't remember the last time she took it  Had a BM yesterday but prior to yesterday's BM, she is unabe to remember last time she had a BM  Described as harder and not as soft as should be  When she is consistent, she has a BM every other day  Denies BRBPR or melena  Colonoscopy completed in 2020 was normal except 1 polyp and was recommended repeat in 3 to 5 years.  Has now weaned off Celexa. Remains on Pantoprazole 40mg once a day- she ran out for about 2-3 weeks before she was able to refill and did have hearbturn in that time. Took "some time" to resolve symptoms after resuming medication  Denies dysphagia or choking episodes Denies vomiting episodes EGD completed in 5/2021 which revealed LA grade C esophagitis with gastritis. Negative for h pylori or intestinal metaplasia. Of note, [facility]

## 2024-04-20 ENCOUNTER — LAB (OUTPATIENT)
Dept: URBAN - METROPOLITAN AREA SURGERY CENTER 18 | Facility: SURGERY CENTER | Age: 54
End: 2024-04-20

## 2024-04-22 ENCOUNTER — LAB (OUTPATIENT)
Dept: URBAN - METROPOLITAN AREA CLINIC 4 | Facility: CLINIC | Age: 54
End: 2024-04-22
Payer: COMMERCIAL

## 2024-04-22 ENCOUNTER — DAC (OUTPATIENT)
Dept: URBAN - METROPOLITAN AREA SURGERY CENTER 18 | Facility: SURGERY CENTER | Age: 54
End: 2024-04-22
Payer: COMMERCIAL

## 2024-04-22 DIAGNOSIS — D12.0 ADENOMA OF CECUM: ICD-10-CM

## 2024-04-22 DIAGNOSIS — D12.3 ADENOMA OF TRANSVERSE COLON: ICD-10-CM

## 2024-04-22 DIAGNOSIS — D12.0 BENIGN NEOPLASM OF CECUM: ICD-10-CM

## 2024-04-22 DIAGNOSIS — Z86.010 ADENOMAS PERSONAL HISTORY OF COLONIC POLYPS: ICD-10-CM

## 2024-04-22 DIAGNOSIS — K63.89 OTHER SPECIFIED DISEASES OF INTESTINE: ICD-10-CM

## 2024-04-22 DIAGNOSIS — D12.3 BENIGN NEOPLASM OF TRANSVERSE COLON: ICD-10-CM

## 2024-04-22 DIAGNOSIS — K63.5 BENIGN COLON POLYP: ICD-10-CM

## 2024-04-22 PROCEDURE — G8907 PT DOC NO EVENTS ON DISCHARG: HCPCS | Performed by: INTERNAL MEDICINE

## 2024-04-22 PROCEDURE — 88305 TISSUE EXAM BY PATHOLOGIST: CPT | Performed by: PATHOLOGY

## 2024-04-22 PROCEDURE — 45380 COLONOSCOPY AND BIOPSY: CPT | Performed by: INTERNAL MEDICINE

## 2024-04-22 RX ORDER — LINACLOTIDE 290 UG/1
1 CAPSULE AT LEAST 30 MINUTES BEFORE THE FIRST MEAL OF THE DAY ON AN EMPTY STOMACH CAPSULE, GELATIN COATED ORAL ONCE A DAY
Qty: 90 | Refills: 3 | Status: ACTIVE | COMMUNITY

## 2024-04-22 RX ORDER — VALACYCLOVIR HYDROCHLORIDE 1 G/1
1 TABLET TABLET, FILM COATED ORAL ONCE A DAY
Status: ACTIVE | COMMUNITY

## 2024-04-22 RX ORDER — CITALOPRAM 20 MG/1
1 TABLET TABLET, FILM COATED ORAL ONCE A DAY
Status: ON HOLD | COMMUNITY

## 2024-04-22 RX ORDER — LINACLOTIDE 72 UG/1
1 CAPSULE AT LEAST 30 MINUTES BEFORE THE FIRST MEAL OF THE DAY ON AN EMPTY STOMACH CAPSULE, GELATIN COATED ORAL ONCE A DAY
Qty: 90 | Refills: 3 | Status: ON HOLD | COMMUNITY

## 2024-04-22 RX ORDER — PANTOPRAZOLE SODIUM 40 MG/1
1 TABLET TABLET, DELAYED RELEASE ORAL ONCE A DAY
Qty: 90 | Refills: 3 | Status: ACTIVE | COMMUNITY

## 2024-07-23 ENCOUNTER — TELEPHONE ENCOUNTER (OUTPATIENT)
Dept: URBAN - METROPOLITAN AREA CLINIC 96 | Facility: CLINIC | Age: 54
End: 2024-07-23

## 2024-07-23 RX ORDER — PANTOPRAZOLE SODIUM 40 MG/1
1 TABLET TABLET, DELAYED RELEASE ORAL ONCE A DAY
Qty: 90 | Refills: 3

## 2024-08-30 ENCOUNTER — TELEPHONE ENCOUNTER (OUTPATIENT)
Dept: URBAN - METROPOLITAN AREA CLINIC 96 | Facility: CLINIC | Age: 54
End: 2024-08-30

## 2024-08-30 RX ORDER — PANTOPRAZOLE SODIUM 40 MG/1
1 TABLET TABLET, DELAYED RELEASE ORAL ONCE A DAY
Qty: 90 | Refills: 3

## 2024-09-05 ENCOUNTER — OFFICE VISIT (OUTPATIENT)
Dept: URBAN - METROPOLITAN AREA TELEHEALTH 2 | Facility: TELEHEALTH | Age: 54
End: 2024-09-05
Payer: COMMERCIAL

## 2024-09-05 VITALS — WEIGHT: 166.7 LBS | BODY MASS INDEX: 28.46 KG/M2 | HEIGHT: 64 IN

## 2024-09-05 DIAGNOSIS — Z86.010 PERSONAL HISTORY OF COLONIC POLYPS: ICD-10-CM

## 2024-09-05 DIAGNOSIS — K21.00 GASTROESOPHAGEAL REFLUX DISEASE WITH ESOPHAGITIS WITHOUT HEMORRHAGE: ICD-10-CM

## 2024-09-05 DIAGNOSIS — K58.1 IRRITABLE BOWEL SYNDROME WITH CONSTIPATION: ICD-10-CM

## 2024-09-05 DIAGNOSIS — R14.0 ABDOMINAL BLOATING: ICD-10-CM

## 2024-09-05 PROBLEM — 68154008: Status: ACTIVE | Noted: 2024-09-05

## 2024-09-05 PROCEDURE — 99213 OFFICE O/P EST LOW 20 MIN: CPT | Performed by: PHYSICIAN ASSISTANT

## 2024-09-05 RX ORDER — PANTOPRAZOLE SODIUM 40 MG/1
1 TABLET TABLET, DELAYED RELEASE ORAL ONCE A DAY
Qty: 90 | Refills: 3 | Status: ACTIVE | COMMUNITY

## 2024-09-05 RX ORDER — LINACLOTIDE 290 UG/1
1 CAPSULE AT LEAST 30 MINUTES BEFORE THE FIRST MEAL OF THE DAY ON AN EMPTY STOMACH CAPSULE, GELATIN COATED ORAL ONCE A DAY
Qty: 90 | Refills: 3
End: 2025-08-31

## 2024-09-05 RX ORDER — VALACYCLOVIR HYDROCHLORIDE 1 G/1
1 TABLET TABLET, FILM COATED ORAL ONCE A DAY
Status: ACTIVE | COMMUNITY

## 2024-09-05 RX ORDER — CITALOPRAM 20 MG/1
1 TABLET TABLET, FILM COATED ORAL ONCE A DAY
Status: ON HOLD | COMMUNITY

## 2024-09-05 RX ORDER — LINACLOTIDE 290 UG/1
1 CAPSULE AT LEAST 30 MINUTES BEFORE THE FIRST MEAL OF THE DAY ON AN EMPTY STOMACH CAPSULE, GELATIN COATED ORAL ONCE A DAY
Qty: 90 | Refills: 3 | Status: ON HOLD | COMMUNITY

## 2024-09-05 RX ORDER — LINACLOTIDE 72 UG/1
1 CAPSULE AT LEAST 30 MINUTES BEFORE THE FIRST MEAL OF THE DAY ON AN EMPTY STOMACH CAPSULE, GELATIN COATED ORAL ONCE A DAY
Qty: 90 | Refills: 3 | Status: ON HOLD | COMMUNITY

## 2024-09-05 RX ORDER — PANTOPRAZOLE SODIUM 40 MG/1
1 TABLET TABLET, DELAYED RELEASE ORAL ONCE A DAY
Qty: 90 | Refills: 3

## 2024-09-05 NOTE — HPI-TODAY'S VISIT:
9/5/24: 53 y/o F here for annual f/u constipation, GERD States overall doing OK Not having BMs as often as wants to Has been out of linzess which is likely worsening stuff Had to use some mag citrate to help Working on weight loss, eating less, but feels should be having BMs BMs q3-4 days, sometimes 5 days between this While on linzess, seems to not have problem, going qod and more predictable and feels well w this No blood/mucus/melena No indigestion/reflux/heartburn w pantoprazole Up to date on labs -- 9/12/23: took linzess 290 and it was not working but then took it after a meal and it worked. colonoscopy 11/2020 does not want to rely on medication for her STC  this all started after her partial hysterecomy in 2010 =========================================== taking linzess daily, pepe snot know the dose but on chart review, maybe  still w/ a lot of constiaptoin can go up to a week with out a BM   ========================= emergent visit for diarrhea went to [facility]. has had diarrhea. 2-3 loose stools. tried peptobismol when she was in [facility]. helped somewhat tried charcoal pills as well which helped. things got worse on 1/8. cramps. multiple loose stools a day stopped malaria pills no recent abx hx of cdiff in may 2021 just does not feel well has a chronic cough after some infection no fever no abd pain. no blood in stool  ==================================== Patient is a 52-year-old female presents via telehealth for follow-up on constipation and reflux.   Last seen by Dr. Wing on 8/22/2022.   Previously taking Linzess and endorses it helps having about 4 bowel movements per week.  Last visit Linzess was increased to 145 mcg.  She states she does not want to be on medication long term. She has not been taking it and reports she can't remember the last time she took it  Had a BM yesterday but prior to yesterday's BM, she is unabe to remember last time she had a BM  Described as harder and not as soft as should be  When she is consistent, she has a BM every other day  Denies BRBPR or melena  Colonoscopy completed in 2020 was normal except 1 polyp and was recommended repeat in 3 to 5 years.  Has now weaned off Celexa. Remains on Pantoprazole 40mg once a day- she ran out for about 2-3 weeks before she was able to refill and did have hearbturn in that time. Took "some time" to resolve symptoms after resuming medication  Denies dysphagia or choking episodes Denies vomiting episodes EGD completed in 5/2021 which revealed LA grade C esophagitis with gastritis. Negative for h pylori or intestinal metaplasia. Of note, [facility]

## 2024-11-18 ENCOUNTER — TELEPHONE ENCOUNTER (OUTPATIENT)
Dept: URBAN - METROPOLITAN AREA CLINIC 96 | Facility: CLINIC | Age: 54
End: 2024-11-18

## 2024-11-18 RX ORDER — LINACLOTIDE 290 UG/1
1 CAPSULE AT LEAST 30 MINUTES BEFORE THE FIRST MEAL OF THE DAY ON AN EMPTY STOMACH CAPSULE, GELATIN COATED ORAL ONCE A DAY
Qty: 90 | Refills: 3
End: 2025-11-13

## 2024-11-18 RX ORDER — PANTOPRAZOLE SODIUM 40 MG/1
1 TABLET TABLET, DELAYED RELEASE ORAL ONCE A DAY
Qty: 90 | Refills: 3

## 2024-11-22 ENCOUNTER — TELEPHONE ENCOUNTER (OUTPATIENT)
Dept: URBAN - METROPOLITAN AREA CLINIC 96 | Facility: CLINIC | Age: 54
End: 2024-11-22

## 2024-11-22 RX ORDER — PANTOPRAZOLE SODIUM 40 MG/1
1 TABLET TABLET, DELAYED RELEASE ORAL ONCE A DAY
Qty: 90 | Refills: 3

## 2025-04-25 ENCOUNTER — TELEPHONE ENCOUNTER (OUTPATIENT)
Dept: URBAN - METROPOLITAN AREA CLINIC 96 | Facility: CLINIC | Age: 55
End: 2025-04-25

## 2025-04-28 ENCOUNTER — OFFICE VISIT (OUTPATIENT)
Dept: URBAN - METROPOLITAN AREA TELEHEALTH 2 | Facility: TELEHEALTH | Age: 55
End: 2025-04-28

## 2025-04-28 RX ORDER — VALACYCLOVIR HYDROCHLORIDE 1 G/1
1 TABLET TABLET, FILM COATED ORAL ONCE A DAY
Status: ACTIVE | COMMUNITY

## 2025-04-28 RX ORDER — LINACLOTIDE 290 UG/1
1 CAPSULE AT LEAST 30 MINUTES BEFORE THE FIRST MEAL OF THE DAY ON AN EMPTY STOMACH CAPSULE, GELATIN COATED ORAL ONCE A DAY
Qty: 90 | Refills: 3 | Status: ACTIVE | COMMUNITY
End: 2025-11-13

## 2025-04-28 RX ORDER — PANTOPRAZOLE SODIUM 40 MG/1
1 TABLET TABLET, DELAYED RELEASE ORAL ONCE A DAY
Qty: 90 | Refills: 3 | Status: ACTIVE | COMMUNITY

## 2025-04-28 RX ORDER — CITALOPRAM 20 MG/1
1 TABLET TABLET, FILM COATED ORAL ONCE A DAY
Status: ON HOLD | COMMUNITY

## 2025-04-28 RX ORDER — LINACLOTIDE 72 UG/1
1 CAPSULE AT LEAST 30 MINUTES BEFORE THE FIRST MEAL OF THE DAY ON AN EMPTY STOMACH CAPSULE, GELATIN COATED ORAL ONCE A DAY
Qty: 90 | Refills: 3 | Status: ON HOLD | COMMUNITY